# Patient Record
Sex: MALE | Race: WHITE | Employment: OTHER | URBAN - NONMETROPOLITAN AREA
[De-identification: names, ages, dates, MRNs, and addresses within clinical notes are randomized per-mention and may not be internally consistent; named-entity substitution may affect disease eponyms.]

---

## 2017-02-17 LAB
ANTIBODY: NEGATIVE
PROSTATE SPECIFIC ANTIGEN: 2.05 NG/ML
TSH SERPL DL<=0.05 MIU/L-ACNC: 2.7 UIU/ML

## 2018-02-13 LAB
AVERAGE GLUCOSE: NORMAL
CHOLESTEROL, TOTAL: 128 MG/DL
CHOLESTEROL/HDL RATIO: NORMAL
HBA1C MFR BLD: 8.5 %
HDLC SERPL-MCNC: 39 MG/DL (ref 35–70)
LDL CHOLESTEROL CALCULATED: 59 MG/DL (ref 0–160)
TRIGL SERPL-MCNC: 152 MG/DL
VLDLC SERPL CALC-MCNC: 89 MG/DL

## 2018-04-19 ENCOUNTER — TELEPHONE (OUTPATIENT)
Dept: FAMILY MEDICINE CLINIC | Age: 58
End: 2018-04-19

## 2020-01-12 ENCOUNTER — APPOINTMENT (OUTPATIENT)
Dept: GENERAL RADIOLOGY | Age: 60
End: 2020-01-12
Payer: OTHER GOVERNMENT

## 2020-01-12 ENCOUNTER — HOSPITAL ENCOUNTER (EMERGENCY)
Age: 60
Discharge: HOME | End: 2020-01-12
Payer: OTHER GOVERNMENT

## 2020-01-12 VITALS
HEIGHT: 72 IN | RESPIRATION RATE: 18 BRPM | OXYGEN SATURATION: 93 % | WEIGHT: 180 LBS | BODY MASS INDEX: 24.38 KG/M2 | DIASTOLIC BLOOD PRESSURE: 83 MMHG | SYSTOLIC BLOOD PRESSURE: 140 MMHG | TEMPERATURE: 99.2 F | HEART RATE: 94 BPM

## 2020-01-12 LAB
FLU A ANTIGEN: NEGATIVE
FLU B ANTIGEN: NEGATIVE

## 2020-01-12 PROCEDURE — 87804 INFLUENZA ASSAY W/OPTIC: CPT

## 2020-01-12 PROCEDURE — 71046 X-RAY EXAM CHEST 2 VIEWS: CPT

## 2020-01-12 PROCEDURE — 6370000000 HC RX 637 (ALT 250 FOR IP): Performed by: NURSE PRACTITIONER

## 2020-01-12 PROCEDURE — 99283 EMERGENCY DEPT VISIT LOW MDM: CPT

## 2020-01-12 RX ORDER — BENZONATATE 100 MG/1
100 CAPSULE ORAL ONCE
Status: COMPLETED | OUTPATIENT
Start: 2020-01-12 | End: 2020-01-12

## 2020-01-12 RX ORDER — BENZONATATE 100 MG/1
100 CAPSULE ORAL 3 TIMES DAILY PRN
Qty: 30 CAPSULE | Refills: 0 | Status: SHIPPED | OUTPATIENT
Start: 2020-01-12 | End: 2020-01-19

## 2020-01-12 RX ORDER — PREDNISONE 10 MG/1
TABLET ORAL
Qty: 20 TABLET | Refills: 0 | Status: SHIPPED | OUTPATIENT
Start: 2020-01-12 | End: 2020-01-22

## 2020-01-12 RX ORDER — IBUPROFEN 200 MG
600 TABLET ORAL ONCE
Status: COMPLETED | OUTPATIENT
Start: 2020-01-12 | End: 2020-01-12

## 2020-01-12 RX ADMIN — BENZONATATE 100 MG: 100 CAPSULE ORAL at 09:30

## 2020-01-12 RX ADMIN — Medication 600 MG: at 09:30

## 2020-01-12 ASSESSMENT — ENCOUNTER SYMPTOMS
RHINORRHEA: 1
SINUS PRESSURE: 0
NAUSEA: 0
DIARRHEA: 0
COLOR CHANGE: 0
EYE REDNESS: 0
VOMITING: 0
BLOOD IN STOOL: 0
CONSTIPATION: 0
ABDOMINAL DISTENTION: 0
WHEEZING: 1
PHOTOPHOBIA: 0
SHORTNESS OF BREATH: 0
CHEST TIGHTNESS: 0
COUGH: 1
SORE THROAT: 0
ABDOMINAL PAIN: 0
BACK PAIN: 0
VOICE CHANGE: 0

## 2020-01-12 ASSESSMENT — PAIN SCALES - GENERAL: PAINLEVEL_OUTOF10: 4

## 2020-01-12 NOTE — ED NOTES
Pt presents to ED for cough and body aches. Pt states he has been feeling sick since Tuesday. Pt states he thinks its is the flu because he didn't get a flu shot.      Vani Calzada  01/12/20 3563

## 2020-01-12 NOTE — ED PROVIDER NOTES
[facility] Emergency Department    CHIEF COMPLAINT       Chief Complaint   Patient presents with    Cough       Nurses Notes reviewed and I agree except as noted in the HPI. HISTORY OF PRESENT ILLNESS    Adam Sams bc 63 y.o. male who presents to the ED for evaluation of cough. Patient reports fever, cough, and generalized myalgias for five days. He notes wheezing, rhinorrhea, weakness, and fatigue. Patient states that two days ago he felt that his symptoms were resolving but then felt that his symptoms worsened that night. He reports that his temperature has been declining to 100F. Patient believes he caught these symptoms from his friend who had a cough one week ago when they went to dinner together. Patient does smoke cigarettes but denies being diagnosed with COPD or any lung disease. He denies a history of pneumonia but believes that he has pneumonia currently. Past medical history of GERD, HLD, HTN, hypothyroidism, nephrolithiasis, and DM. No further complaints at initial encounter. Pain description:  Onset: five days ago   Location: cough, generalized myalgias, fever  Duration: constant   Aggravating factors: weakness, fatigue, rhinorrhea, wheezing   Timing: felt relief two days ago before worsening of symptoms that night   Severity: 5/10    Experienced previously: no    HPI was provided by the patient. REVIEW OF SYSTEMS     Review of Systems   Constitutional: Positive for fatigue. Negative for appetite change, chills, diaphoresis and unexpected weight change. Fever: 100F. HENT: Positive for rhinorrhea. Negative for congestion, hearing loss, postnasal drip, sinus pressure, sore throat and voice change. Eyes: Negative for photophobia, redness and visual disturbance. Respiratory: Positive for cough and wheezing. Negative for chest tightness and shortness of breath. Cardiovascular: Negative for chest pain and palpitations.    Gastrointestinal: Negative for abdominal distention, abdominal pain, blood in stool, constipation, diarrhea, nausea and vomiting. Endocrine: Negative for cold intolerance, heat intolerance, polydipsia, polyphagia and polyuria. Genitourinary: Negative for decreased urine volume, difficulty urinating, dysuria, flank pain and frequency. Musculoskeletal: Positive for myalgias (generalized). Negative for arthralgias, back pain, gait problem, joint swelling, neck pain and neck stiffness. Skin: Negative for color change and rash. Allergic/Immunologic: Negative for immunocompromised state. Neurological: Positive for weakness. Negative for dizziness, tremors, light-headedness, numbness and headaches. Hematological: Does not bruise/bleed easily. Psychiatric/Behavioral: Negative for behavioral problems, confusion, decreased concentration, hallucinations, self-injury and suicidal ideas. The patient is not nervous/anxious. PAST MEDICAL HISTORY     Past Medical History:   Diagnosis Date    Anxiety     GERD (gastroesophageal reflux disease)     Hyperlipidemia     Hypertension     Hypothyroidism     Nephrolithiasis     Proteinuria     Type II or unspecified type diabetes mellitus without mention of complication, not stated as uncontrolled ([facility] [address]        SURGICALHISTORY      has no past surgical history on file. CURRENT MEDICATIONS       Discharge Medication List as of 1/12/2020 10:17 AM      CONTINUE these medications which have NOT CHANGED    Details   levothyroxine (LEVOTHROID) 50 MCG tablet Take 1 tablet daily. , Disp-90 tablet, R-1Normal      lisinopril (PRINIVIL;ZESTRIL) 10 MG tablet Take 1 tablet by mouth daily. , Disp-90 tablet, R-3Normal      nitroGLYCERIN (NITROSTAT) 0.4 MG SL tablet Place 0.4 mg under the tongue every 5 minutes as needed for Chest pain. ibuprofen (ADVIL;MOTRIN) 200 MG tablet Take 200 mg by mouth every 6 hours as needed for Pain. sertraline (ZOLOFT) 50 MG tablet Take 50 mg by mouth 2 times daily.       metoprolol (LOPRESSOR) 25 MG tablet Take 1 tablet by mouth 2 times daily. , Disp-60 tablet, R-3      metformin (GLUCOPHAGE) 1000 MG tablet Take 0.5 tablets by mouth daily (with breakfast). , Disp-180 tablet, R-3      pravastatin (PRAVACHOL) 40 MG tablet Take 2 tablets by mouth every evening., Disp-180 tablet, R-2      omeprazole (PRILOSEC) 20 MG capsule Take 1 capsule by mouth Daily. , Disp-30 capsule, R-11      aspirin EC 81 MG EC tablet Take 1 tablet by mouth daily. , Disp-90 tablet, R-3             ALLERGIES     is allergic to wellbutrin [bupropion hcl]. FAMILY HISTORY     He indicated that his mother is . He indicated that his father is . family history includes Heart Disease in his father; High Blood Pressure in his mother; Stroke in his mother.     SOCIAL HISTORY       Social History     Socioeconomic History    Marital status:      Spouse name: Not on file    Number of children: Not on file    Years of education: Not on file    Highest education level: Not on file   Occupational History     Employer: [employer]    Financial resource strain: Not on file    Food insecurity:     Worry: Not on file     Inability: Not on file   [employer] needs:     Medical: Not on file     Non-medical: Not on file   Tobacco Use    Smoking status: Current Every Day Smoker     Packs/day: 0.25     Types: Cigarettes    Smokeless tobacco: Never Used   Substance and Sexual Activity    Alcohol use: No    Drug use: No    Sexual activity: Not on file   Lifestyle    Physical activity:     Days per week: Not on file     Minutes per session: Not on file    Stress: Not on file   Relationships    Social connections:     Talks on phone: Not on file     Gets together: Not on file     Attends [employer] service: Not on file     Active member of club or organization: Not on file     Attends meetings of clubs or organizations: Not on file     Relationship status: Not on file    Intimate partner violence:     Fear of current or ex partner: Not on file     Emotionally abused: Not on file     Physically abused: Not on file     Forced sexual activity: Not on file   Other Topics Concern    Not on file   Social History Narrative    Not on file       PHYSICAL EXAM     INITIAL VITALS:  height is 6' (1.829 m) and weight is 180 lb (81.6 kg). His temperature is 99.2 °F (37.3 °C). His blood pressure is 140/83 (abnormal) and his pulse is 94. His respiration is 18 and oxygen saturation is 93%. Physical Exam  Vitals signs and nursing note reviewed. Constitutional:       Appearance: Normal appearance. He is well-developed. HENT:      Head: Normocephalic. Right Ear: External ear normal.      Left Ear: External ear normal.      Nose: Nose normal.      Mouth/Throat:      Pharynx: Uvula midline. Eyes:      Conjunctiva/sclera: Conjunctivae normal.      Pupils: Pupils are equal, round, and reactive to light. Neck:      Musculoskeletal: Normal range of motion and neck supple. Cardiovascular:      Rate and Rhythm: Normal rate and regular rhythm. Heart sounds: Normal heart sounds, S1 normal and S2 normal.   Pulmonary:      Effort: Pulmonary effort is normal. No respiratory distress. Breath sounds: Normal breath sounds. No decreased breath sounds, wheezing, rhonchi or rales. Chest:      Chest wall: No tenderness. Abdominal:      General: Bowel sounds are normal. There is no distension. Palpations: Abdomen is soft. Tenderness: There is no tenderness. Musculoskeletal: Normal range of motion. Skin:     General: Skin is warm and dry. Coloration: Skin is not pale. Findings: No erythema or rash. Neurological:      Mental Status: He is alert and oriented to person, place, and time. Psychiatric:         Behavior: Behavior normal.         Thought Content:  Thought content normal.         Judgment: Judgment normal.         DIFFERENTIAL DIAGNOSIS:   Including but not limited to verbalized understanding. While here in the emergency department vital signs improved and he was appropriate for discharge. Medications   ibuprofen (ADVIL;MOTRIN) tablet 600 mg (600 mg Oral Given 1/12/20 0930)   benzonatate (TESSALON) capsule 100 mg (100 mg Oral Given 1/12/20 0930)       Patient was seenindependently by myself. The patient's final impression and disposition and plan was determined by myself. CRITICAL CARE:   None    CONSULTS:  None    PROCEDURES:  None    FINAL IMPRESSION     1. Viral URI with cough          DISPOSITION/PLAN   Discharge     PATIENT REFERREDTO:  Octavioprema Aniya, [address] [facility] [address]  [phone]    Call in 4 days  If symptoms worsen or fail to improve      DISCHARGE MEDICATIONS:  Discharge Medication List as of 1/12/2020 10:17 AM      START taking these medications    Details   benzonatate (TESSALON PERLES) 100 MG capsule Take 1 capsule by mouth 3 times daily as needed for Cough, Disp-30 capsule, R-0Print      predniSONE (DELTASONE) 10 MG tablet Take 4 tablets by mouth once daily for 5 days, Disp-20 tablet, R-0Print             (Please note that portions of this note were completed with a voice recognition program.  Efforts were made to edit the dictations but occasionally words are mis-transcribed.)    Scribe:  Coby Kocher 1/12/20 9:15 AM Scribing for and in the presence of Adarsh Bowers CNP. Signed by: Coby Kocher, Scribe, 01/12/20 12:06 PM    Provider:  I personally performed the services described in the documentation,reviewed and edited the documentation which was dictated to the scribe in my presence, and it accurately records my words and actions.     Adarsh Bowers CNP 01/12/20 12:06 PM    [address] CNP  01/12/20 4040

## 2021-05-25 ENCOUNTER — HOSPITAL ENCOUNTER (EMERGENCY)
Age: 61
Discharge: HOME | End: 2021-05-25
Attending: EMERGENCY MEDICINE
Payer: OTHER GOVERNMENT

## 2021-05-25 VITALS
HEIGHT: 72 IN | WEIGHT: 192 LBS | TEMPERATURE: 97.1 F | OXYGEN SATURATION: 96 % | DIASTOLIC BLOOD PRESSURE: 79 MMHG | HEART RATE: 83 BPM | RESPIRATION RATE: 16 BRPM | SYSTOLIC BLOOD PRESSURE: 129 MMHG | BODY MASS INDEX: 26.01 KG/M2

## 2021-05-25 DIAGNOSIS — L72.3 SEBACEOUS CYST: Primary | ICD-10-CM

## 2021-05-25 DIAGNOSIS — Z20.822 PERSON UNDER INVESTIGATION FOR COVID-19: ICD-10-CM

## 2021-05-25 DIAGNOSIS — B34.9 SYSTEMIC VIRAL ILLNESS: ICD-10-CM

## 2021-05-25 DIAGNOSIS — E11.9 DIABETES MELLITUS TYPE 2 IN NONOBESE (HCC): ICD-10-CM

## 2021-05-25 DIAGNOSIS — F17.200 TOBACCO USE DISORDER: ICD-10-CM

## 2021-05-25 LAB — SARS-COV-2, NAA: NOT DETECTED

## 2021-05-25 PROCEDURE — 87635 SARS-COV-2 COVID-19 AMP PRB: CPT

## 2021-05-25 PROCEDURE — 99213 OFFICE O/P EST LOW 20 MIN: CPT | Performed by: EMERGENCY MEDICINE

## 2021-05-25 PROCEDURE — 99213 OFFICE O/P EST LOW 20 MIN: CPT

## 2021-05-25 PROCEDURE — 10060 I&D ABSCESS SIMPLE/SINGLE: CPT | Performed by: EMERGENCY MEDICINE

## 2021-05-25 RX ORDER — ALOGLIPTIN 25 MG/1
25 TABLET, FILM COATED ORAL DAILY
COMMUNITY

## 2021-05-25 RX ORDER — CETIRIZINE HYDROCHLORIDE 10 MG/1
10 TABLET ORAL DAILY
COMMUNITY

## 2021-05-25 RX ORDER — FAMOTIDINE 20 MG/1
20 TABLET, FILM COATED ORAL 2 TIMES DAILY
COMMUNITY

## 2021-05-25 RX ORDER — KETOTIFEN FUMARATE 0.35 MG/ML
1 SOLUTION/ DROPS OPHTHALMIC PRN
COMMUNITY

## 2021-05-25 RX ORDER — GABAPENTIN 100 MG/1
100 CAPSULE ORAL 3 TIMES DAILY
COMMUNITY
End: 2021-12-22

## 2021-05-25 RX ORDER — FLUTICASONE PROPIONATE 50 MCG
1 SPRAY, SUSPENSION (ML) NASAL DAILY
COMMUNITY

## 2021-05-25 RX ORDER — GLIPIZIDE 5 MG/1
5 TABLET, FILM COATED, EXTENDED RELEASE ORAL DAILY
COMMUNITY

## 2021-05-25 RX ORDER — DOXYCYCLINE HYCLATE 100 MG
100 TABLET ORAL 2 TIMES DAILY
Qty: 14 TABLET | Refills: 0 | Status: SHIPPED | OUTPATIENT
Start: 2021-05-25 | End: 2021-06-01

## 2021-05-25 RX ORDER — INSULIN GLARGINE 100 [IU]/ML
INJECTION, SOLUTION SUBCUTANEOUS DAILY
COMMUNITY

## 2021-05-25 RX ORDER — LIDOCAINE HYDROCHLORIDE AND EPINEPHRINE BITARTRATE 20; .01 MG/ML; MG/ML
INJECTION, SOLUTION SUBCUTANEOUS
Status: DISCONTINUED
Start: 2021-05-25 | End: 2021-05-25 | Stop reason: HOSPADM

## 2021-05-25 ASSESSMENT — ENCOUNTER SYMPTOMS
STRIDOR: 0
SINUS PRESSURE: 0
BACK PAIN: 0
VOMITING: 0
SORE THROAT: 0
TROUBLE SWALLOWING: 0
WHEEZING: 0
EYE PAIN: 0
EYE REDNESS: 0
NAUSEA: 0
SHORTNESS OF BREATH: 0
COLOR CHANGE: 1
DIARRHEA: 0
EYE DISCHARGE: 0
ABDOMINAL PAIN: 0
COUGH: 0
VOICE CHANGE: 0

## 2021-05-25 NOTE — ED PROVIDER NOTES
negative. PAST MEDICAL HISTORY         Diagnosis Date    Anxiety     Depression     GERD (gastroesophageal reflux disease)     Hyperlipidemia     Hypertension     Hypothyroidism     Nephrolithiasis     Proteinuria     Type II or unspecified type diabetes mellitus without mention of complication, not stated as uncontrolled        SURGICAL HISTORY     Patient  has no past surgical history on file. CURRENT MEDICATIONS       Discharge Medication List as of 5/25/2021 10:41 AM      CONTINUE these medications which have NOT CHANGED    Details   famotidine (PEPCID) 20 MG tablet Take 20 mg by mouth 2 times dailyHistorical Med      insulin glargine (LANTUS) 100 UNIT/ML injection vial Inject into the skin dailyHistorical Med      glipiZIDE (GLUCOTROL XL) 5 MG extended release tablet Take 5 mg by mouth dailyHistorical Med      alogliptin (NESINA) 25 MG TABS tablet Take 25 mg by mouth dailyHistorical Med      fluticasone (FLONASE) 50 MCG/ACT nasal spray 1 spray by Each Nostril route dailyHistorical Med      gabapentin (NEURONTIN) 100 MG capsule Take 100 mg by mouth 3 times daily. Historical Med      cetirizine (ZYRTEC) 10 MG tablet Take 10 mg by mouth dailyHistorical Med      ketotifen (KP KETOTIFEN FUMARATE) 0.025 % ophthalmic solution 1 drop as neededHistorical Med      levothyroxine (LEVOTHROID) 50 MCG tablet Take 1 tablet daily. , Disp-90 tablet, R-1Normal      lisinopril (PRINIVIL;ZESTRIL) 10 MG tablet Take 1 tablet by mouth daily. , Disp-90 tablet, R-3Normal      nitroGLYCERIN (NITROSTAT) 0.4 MG SL tablet Place 0.4 mg under the tongue every 5 minutes as needed for Chest pain. ibuprofen (ADVIL;MOTRIN) 200 MG tablet Take 200 mg by mouth every 6 hours as needed for Pain.      metoprolol (LOPRESSOR) 25 MG tablet Take 1 tablet by mouth 2 times daily. , Disp-60 tablet, R-3      metformin (GLUCOPHAGE) 1000 MG tablet Take 0.5 tablets by mouth daily (with breakfast). , Disp-180 tablet, R-3      pravastatin (PRAVACHOL) 40 MG tablet Take 2 tablets by mouth every evening., Disp-180 tablet, R-2      omeprazole (PRILOSEC) 20 MG capsule Take 1 capsule by mouth Daily. , Disp-30 capsule, R-11      aspirin EC 81 MG EC tablet Take 1 tablet by mouth daily. , Disp-90 tablet, R-3             ALLERGIES     Patient is is allergic to wellbutrin [bupropion hcl]. FAMILY HISTORY     Patient'sfamily history includes Heart Disease in his father; High Blood Pressure in his mother; Stroke in his mother. SOCIAL HISTORY     Patient  reports that he has been smoking cigarettes. He has been smoking about 0.25 packs per day. He has never used smokeless tobacco. He reports that he does not drink alcohol and does not use drugs. PHYSICAL EXAM     ED TRIAGE VITALS  BP: 129/79, Temp: 97.1 °F (36.2 °C), Pulse: 83, Resp: 16, SpO2: 96 %  Physical Exam  Vitals and nursing note reviewed. Constitutional:       General: He is not in acute distress. Appearance: He is well-developed. He is not ill-appearing. Comments: Moist membranes, normal airway   HENT:      Head: Normocephalic and atraumatic. Right Ear: Tympanic membrane and external ear normal.      Left Ear: Tympanic membrane and external ear normal.      Nose: Nose normal.      Mouth/Throat:      Pharynx: No oropharyngeal exudate or posterior oropharyngeal erythema. Tonsils: No tonsillar exudate. Comments: Oropharynx normal  Eyes:      General: No scleral icterus. Right eye: No discharge. Left eye: No discharge. Extraocular Movements:      Right eye: Normal extraocular motion. Left eye: Normal extraocular motion. Conjunctiva/sclera: Conjunctivae normal.      Pupils: Pupils are equal, round, and reactive to light. Comments:     Conjunctiva clear     Neck:      Thyroid: No thyromegaly. Vascular: No JVD. Comments: No meningismus  Cardiovascular:      Rate and Rhythm: Normal rate and regular rhythm. Pulses: Normal pulses. Heart sounds: Normal heart sounds, S1 normal and S2 normal. No murmur heard. No friction rub. No gallop. Pulmonary:      Effort: Pulmonary effort is normal. No respiratory distress. Breath sounds: Normal breath sounds. No stridor. No wheezing or rales. Comments: No cough, lungs clear  Chest:      Chest wall: No tenderness. Abdominal:      General: Bowel sounds are normal. There is no distension. Palpations: Abdomen is soft. There is no mass. Tenderness: There is no abdominal tenderness. There is no right CVA tenderness, left CVA tenderness, guarding or rebound. Comments: Soft nontender   Musculoskeletal:         General: No tenderness. Normal range of motion. Cervical back: Normal range of motion. Lymphadenopathy:      Cervical: Cervical adenopathy present. Right cervical: Superficial cervical adenopathy present. No deep cervical adenopathy. Left cervical: Superficial cervical adenopathy present. No deep cervical adenopathy. Skin:     General: Skin is warm and dry. Findings: No erythema or rash. Comments: Sebaceous cyst thoracic back versus embedded foreign body. Doubt tick   Neurological:      Mental Status: He is alert and oriented to person, place, and time. Cranial Nerves: No cranial nerve deficit. Motor: No abnormal muscle tone. Coordination: Coordination normal.      Deep Tendon Reflexes: Reflexes are normal and symmetric. Reflexes normal.      Comments: Appropriate, no focal finding   Psychiatric:         Behavior: Behavior normal.         Thought Content:  Thought content normal.         Judgment: Judgment normal.         DIAGNOSTIC RESULTS   Labs:   Results for orders placed or performed during the hospital encounter of 05/25/21   COVID-19   Result Value Ref Range    SARS-CoV-2, LARISSA Not Detected NOT DETECTED       IMAGING:  No orders to display     URGENT CARE COURSE:     Vitals:    05/25/21 1008   BP: 129/79   Pulse: 83   Resp: 16 Temp: 97.1 °F (36.2 °C)   TempSrc: Infrared   SpO2: 96%   Weight: 192 lb (87.1 kg)   Height: 6' (1.829 m)       Medications   lidocaine-EPINEPHrine 2 percent-1:827629 injection (has no administration in time range)   Tolerated well  PROCEDURES:  Foreign body removal thoracic back. Patient received copious amounts of wound cleansing spray and Betadine cleansing. Site was anesthetized with 2% lidocaine with epinephrine approximately 2 mL total for complete anesthesia. Using 11 scalpel blade skin overlying embedded foreign body was unroofed and appeared to be sebaceous cyst/epidermoid cyst with hardened sebaceous material.  All sebaceous material removed. No tick. No woody foreign body. No purulent discharge. Patient tolerated well without complications and improved. Additional cleansing accomplished with wound cleansing spray and Band-Aid applied over wound. No pack indicated. FINALIMPRESSION      1. Sebaceous cyst    2. Systemic viral illness    3. Person under investigation for COVID-19    4. Tobacco use disorder    5. Diabetes mellitus type 2 in nonobese (HCC)        DISPOSITION/PLAN   DISPOSITION    Nontoxic, well-hydrated, normal airway. No airway abscess or epiglottitis, sepsis, CNS infection, pneumonia, hypoxia, bronchospasm. No ACS, CHF, PE.  COVID-19 test performed and rapid result negative. No bacterial infection apparent. Patient had sebaceous cyst that was incised and drained. Will treat with doxycycline, Bactroban ointment after peroxide cleaning, Tylenol, increased oral clear liquids, rest.  Patient to follow-up with PCP in 6 days for recheck, and he understands to go to ED if worse.   PATIENT REFERRED TO:  Darien Hadley[address] [address] [phone]    Schedule an appointment as soon as possible for a visit in 6 days  Recheck if problems persist, go to emergency department    DISCHARGE MEDICATIONS:  Discharge Medication List as of 5/25/2021 10:41 AM

## 2021-05-25 NOTE — ED NOTES
Covid nasal pharyngeal swab obtained and sent to lab. Proper ppe worn during procedure. Pt tolerated well. Pt. Released in stable condition, ambulated per self to private car. Instructed pt to follow-up with family doctor as needed for recheck or go directly to the emergency department for any concerns/worsening conditions. Pt. Verbalized understanding of instructions. No questions at this time. RX in hand.       Jenny Dye RN  05/25/21 [id number]

## 2021-05-25 NOTE — ED TRIAGE NOTES
Noticed a tick on back last night, has had a headache, has had an off an on fever for the last 3 days, fatigued

## 2021-12-22 ENCOUNTER — HOSPITAL ENCOUNTER (EMERGENCY)
Age: 61
Discharge: HOME | End: 2021-12-22
Payer: OTHER GOVERNMENT

## 2021-12-22 VITALS
OXYGEN SATURATION: 98 % | BODY MASS INDEX: 26.04 KG/M2 | DIASTOLIC BLOOD PRESSURE: 63 MMHG | TEMPERATURE: 98.2 F | WEIGHT: 192 LBS | HEART RATE: 69 BPM | RESPIRATION RATE: 18 BRPM | SYSTOLIC BLOOD PRESSURE: 114 MMHG

## 2021-12-22 DIAGNOSIS — J20.9 ACUTE BRONCHITIS, UNSPECIFIED ORGANISM: Primary | ICD-10-CM

## 2021-12-22 DIAGNOSIS — Z20.822 LAB TEST NEGATIVE FOR COVID-19 VIRUS: ICD-10-CM

## 2021-12-22 LAB — SARS-COV-2, NAA: NOT  DETECTED

## 2021-12-22 PROCEDURE — 99213 OFFICE O/P EST LOW 20 MIN: CPT

## 2021-12-22 PROCEDURE — 87635 SARS-COV-2 COVID-19 AMP PRB: CPT

## 2021-12-22 PROCEDURE — 99213 OFFICE O/P EST LOW 20 MIN: CPT | Performed by: NURSE PRACTITIONER

## 2021-12-22 RX ORDER — AZITHROMYCIN 250 MG/1
TABLET, FILM COATED ORAL
Qty: 6 TABLET | Refills: 0 | Status: SHIPPED | OUTPATIENT
Start: 2021-12-22

## 2021-12-22 ASSESSMENT — ENCOUNTER SYMPTOMS
DIARRHEA: 0
VOMITING: 0
SHORTNESS OF BREATH: 0
TROUBLE SWALLOWING: 0
SINUS PRESSURE: 0
BACK PAIN: 0
NAUSEA: 0
COUGH: 1
RHINORRHEA: 1
SORE THROAT: 0

## 2021-12-22 NOTE — ED TRIAGE NOTES
Patient states  exposed to covid, nasal swab obtained. Labeled, taken to lab, tolerated well. Nurse  Wearing PPE. Symptoms started 7 days ago, dry cough, intermittent dull headaches, runny nose, yellow mucus, fever. Ibuprofen taken.

## 2021-12-22 NOTE — ED PROVIDER NOTES
[facility]  Urgent Care Encounter       CHIEF COMPLAINT       Chief Complaint   Patient presents with    Cough    Nasal Congestion    Concern For COVID-19     fever    Nausea       Nurses Notes reviewed and I agree except as noted in the HPI. HISTORY OF PRESENT ILLNESS   Suraj Ayala is a 62 y.o. male who presents to the urgent care center complaining of a productive cough nasal congestion some slight nausea. Patient did have concern for Covid apparently his friend tested positive for Covid who he has been around. Patient denies any loss of taste, chest pain shortness of breath. At present time patient does not appear to be in any acute distress. (See HPI template)    The history is provided by the patient. No  was used. Cough  Cough characteristics:  Productive  Sputum characteristics:  Yellow  Severity:  Moderate  Onset quality:  Sudden  Duration:  7 days  Timing:  Intermittent  Progression:  Waxing and waning  Chronicity:  New  Smoker: yes    Context: sick contacts    Associated symptoms: rhinorrhea    Associated symptoms: no chest pain, no chills, no ear pain, no fever, no headaches, no rash, no shortness of breath and no sore throat        REVIEW OF SYSTEMS     Review of Systems   Constitutional: Negative for activity change, appetite change, chills, fatigue and fever. HENT: Positive for congestion and rhinorrhea. Negative for ear pain, sinus pressure, sore throat and trouble swallowing. Respiratory: Positive for cough. Negative for shortness of breath. Cardiovascular: Negative for chest pain. Gastrointestinal: Negative for diarrhea, nausea and vomiting. Musculoskeletal: Negative for back pain and neck stiffness. Skin: Negative for rash. Allergic/Immunologic: Negative for environmental allergies. Neurological: Negative for dizziness, light-headedness and headaches. Hematological: Negative for adenopathy.        PAST MEDICAL HISTORY         Diagnosis Date    Anxiety     Depression     GERD (gastroesophageal reflux disease)     Hyperlipidemia     Hypertension     Hypothyroidism     Nephrolithiasis     Proteinuria     Type II or unspecified type diabetes mellitus without mention of complication, not stated as uncontrolled        SURGICALHISTORY     Patient  has no past surgical history on file. CURRENT MEDICATIONS       Discharge Medication List as of 12/22/2021  3:28 PM      CONTINUE these medications which have NOT CHANGED    Details   famotidine (PEPCID) 20 MG tablet Take 20 mg by mouth 2 times dailyHistorical Med      insulin glargine (LANTUS) 100 UNIT/ML injection vial Inject into the skin dailyHistorical Med      glipiZIDE (GLUCOTROL XL) 5 MG extended release tablet Take 5 mg by mouth dailyHistorical Med      alogliptin (NESINA) 25 MG TABS tablet Take 25 mg by mouth dailyHistorical Med      fluticasone (FLONASE) 50 MCG/ACT nasal spray 1 spray by Each Nostril route dailyHistorical Med      cetirizine (ZYRTEC) 10 MG tablet Take 10 mg by mouth dailyHistorical Med      ketotifen (KP KETOTIFEN FUMARATE) 0.025 % ophthalmic solution 1 drop as neededHistorical Med      levothyroxine (LEVOTHROID) 50 MCG tablet Take 1 tablet daily. , Disp-90 tablet, R-1Normal      lisinopril (PRINIVIL;ZESTRIL) 10 MG tablet Take 1 tablet by mouth daily. , Disp-90 tablet, R-3Normal      ibuprofen (ADVIL;MOTRIN) 200 MG tablet Take 200 mg by mouth every 6 hours as needed for Pain.      metoprolol (LOPRESSOR) 25 MG tablet Take 1 tablet by mouth 2 times daily. , Disp-60 tablet, R-3      metformin (GLUCOPHAGE) 1000 MG tablet Take 0.5 tablets by mouth daily (with breakfast). , Disp-180 tablet, R-3      pravastatin (PRAVACHOL) 40 MG tablet Take 2 tablets by mouth every evening., Disp-180 tablet, R-2      omeprazole (PRILOSEC) 20 MG capsule Take 1 capsule by mouth Daily. , Disp-30 capsule, R-11      aspirin EC 81 MG EC tablet Take 1 tablet by mouth daily. , Disp-90 tablet, R-3      nitroGLYCERIN (NITROSTAT) 0.4 MG SL tablet Place 0.4 mg under the tongue every 5 minutes as needed for Chest pain. ALLERGIES     Patient is is allergic to wellbutrin [bupropion hcl]. Patients   Immunization History   Administered Date(s) Administered    Td, unspecified formulation 07/10/2008    Tdap (Boostrix, Adacel) 02/16/2017       FAMILY HISTORY     Patient's family history includes Heart Disease in his father; High Blood Pressure in his mother; Stroke in his mother. SOCIAL HISTORY     Patient  reports that he has been smoking cigarettes. He has been smoking about 0.25 packs per day. He has never used smokeless tobacco. He reports that he does not drink alcohol and does not use drugs. PHYSICAL EXAM     ED TRIAGE VITALS  BP: 114/63, Temp: 98.2 °F (36.8 °C), Pulse: 69, Resp: 18, SpO2: 98 %,Estimated body mass index is 26.04 kg/m² as calculated from the following:    Height as of 5/25/21: 6' (1.829 m). Weight as of this encounter: 192 lb (87.1 kg). ,No LMP for male patient. Physical Exam  Vitals and nursing note reviewed. Constitutional:       General: He is not in acute distress. Appearance: He is well-developed. He is not ill-appearing, toxic-appearing or diaphoretic. HENT:      Head: Normocephalic. Right Ear: Hearing, tympanic membrane, ear canal and external ear normal. Tympanic membrane is not erythematous. Left Ear: Hearing, tympanic membrane, ear canal and external ear normal. Tympanic membrane is not erythematous. Nose: Congestion present. No rhinorrhea. Right Turbinates: Not enlarged or swollen. Left Turbinates: Not enlarged or swollen. Mouth/Throat:      Lips: Pink. Mouth: Mucous membranes are moist.      Tongue: No lesions. Pharynx: Oropharynx is clear. Uvula midline. No pharyngeal swelling, oropharyngeal exudate, posterior oropharyngeal erythema or uvula swelling.       Tonsils: No tonsillar exudate or tonsillar abscesses. Eyes:      Conjunctiva/sclera: Conjunctivae normal.      Pupils: Pupils are equal, round, and reactive to light. Cardiovascular:      Rate and Rhythm: Normal rate and regular rhythm. Heart sounds: Normal heart sounds, S1 normal and S2 normal.   Pulmonary:      Effort: Pulmonary effort is normal. No accessory muscle usage. Breath sounds: Normal breath sounds and air entry. No decreased air movement. No decreased breath sounds, wheezing, rhonchi or rales. Musculoskeletal:      Cervical back: Full passive range of motion without pain, normal range of motion and neck supple. No rigidity. Lymphadenopathy:      Head:      Right side of head: No submental, submandibular, tonsillar, preauricular, posterior auricular or occipital adenopathy. Left side of head: No submental, submandibular, tonsillar, preauricular, posterior auricular or occipital adenopathy. Cervical: No cervical adenopathy. Right cervical: No superficial, deep or posterior cervical adenopathy. Left cervical: No superficial, deep or posterior cervical adenopathy. Skin:     General: Skin is warm and dry. Capillary Refill: Capillary refill takes less than 2 seconds. Neurological:      General: No focal deficit present. Mental Status: He is alert and oriented to person, place, and time. Psychiatric:         Mood and Affect: Mood normal.         Speech: Speech normal.         Behavior: Behavior normal. Behavior is cooperative.          DIAGNOSTIC RESULTS     Labs:  Results for orders placed or performed during the hospital encounter of 12/22/21   COVID-19, Rapid   Result Value Ref Range    SARS-CoV-2, LARISSA NOT  DETECTED NOT DETECTED       IMAGING:    No orders to display         EKG:      URGENT CARE COURSE:     Vitals:    12/22/21 1458   BP: 114/63   Pulse: 69   Resp: 18   Temp: 98.2 °F (36.8 °C)   TempSrc: Oral   SpO2: 98%   Weight: 192 lb (87.1 kg)       Medications - No data to display         PROCEDURES:  None    FINAL IMPRESSION      1. Acute bronchitis, unspecified organism    2. Lab test negative for COVID-19 virus          DISPOSITION/ PLAN      The patient/Patient representative was advised to rest, drink lots of fluids, take Motrin and Tylenol for any fever, chills generalized body aches. The patient was also advised to monitor urine output for signs of dehydration. If the patient develops any chest pain, shortness of breath, neck pain or stiffness or abdominal pain or any other concerns, the patient is to call 911 or go to the emergency department for further evaluation. If the patient does not experience any of the above symptoms he is to follow-up with his primary care provider in 2-3 days for reevaluation. The patient/Patient representative are agreeable to the treatment plan at this time. The patient left the urgent care center in stable condition.     PATIENT REFERRED TO:  Jesus Moore MD  [address] / [place] [address]      DISCHARGE MEDICATIONS:  Discharge Medication List as of 12/22/2021  3:28 PM      START taking these medications    Details   azithromycin (ZITHROMAX Z-AMELIE) 250 MG tablet 2 tablets day 1 then1 tablet days 2 - 5., Disp-6 tablet, R-0Normal             Discharge Medication List as of 12/22/2021  3:28 PM      STOP taking these medications       gabapentin (NEURONTIN) 100 MG capsule Comments:   Reason for Stopping:               Discharge Medication List as of 12/22/2021  3:28 PM          CED Barajas CNP    (Please note that portions of this note were completed with a voice recognition program. Efforts were made to edit the dictations but occasionally words are mis-transcribed.)           CED Barajas CNP  12/22/21 6387

## 2022-06-03 ENCOUNTER — HOSPITAL ENCOUNTER (OUTPATIENT)
Dept: CT IMAGING | Age: 62
Discharge: HOME | End: 2022-06-03
Payer: OTHER GOVERNMENT

## 2022-06-03 ENCOUNTER — HOSPITAL ENCOUNTER (OUTPATIENT)
Dept: ULTRASOUND IMAGING | Age: 62
Discharge: HOME | End: 2022-06-03
Payer: OTHER GOVERNMENT

## 2022-06-03 DIAGNOSIS — Z13.6 ENCOUNTER FOR SCREENING FOR CARDIOVASCULAR DISORDERS: ICD-10-CM

## 2022-06-03 DIAGNOSIS — Z87.891 PERSONAL HISTORY OF TOBACCO USE, PRESENTING HAZARDS TO HEALTH: ICD-10-CM

## 2022-06-03 DIAGNOSIS — F17.200 CURRENT SMOKER: ICD-10-CM

## 2022-06-03 PROCEDURE — 76706 US ABDL AORTA SCREEN AAA: CPT

## 2022-06-03 PROCEDURE — 71271 CT THORAX LUNG CANCER SCR C-: CPT

## 2022-11-04 ENCOUNTER — HOSPITAL ENCOUNTER (EMERGENCY)
Age: 62
Discharge: HOME | End: 2022-11-04
Payer: OTHER GOVERNMENT

## 2022-11-04 VITALS
DIASTOLIC BLOOD PRESSURE: 85 MMHG | TEMPERATURE: 98.2 F | OXYGEN SATURATION: 98 % | SYSTOLIC BLOOD PRESSURE: 152 MMHG | RESPIRATION RATE: 18 BRPM | HEART RATE: 82 BPM

## 2022-11-04 DIAGNOSIS — R81 GLUCOSURIA WITH NORMAL SERUM GLUCOSE: ICD-10-CM

## 2022-11-04 LAB
ALBUMIN SERPL-MCNC: 4.2 G/DL (ref 3.5–5.1)
ALP BLD-CCNC: 73 U/L (ref 38–126)
ALT SERPL-CCNC: 15 U/L (ref 11–66)
ANION GAP SERPL CALCULATED.3IONS-SCNC: 14 MEQ/L (ref 8–16)
ANISOCYTOSIS: PRESENT
AST SERPL-CCNC: 21 U/L (ref 5–40)
BASOPHILS # BLD: 1.1 %
BASOPHILS ABSOLUTE: 0.1 THOU/MM3 (ref 0–0.1)
BETA-HYDROXYBUTYRATE: 6.65 MG/DL (ref 0.2–2.81)
BILIRUB SERPL-MCNC: 0.3 MG/DL (ref 0.3–1.2)
BILIRUBIN URINE: NEGATIVE
BLOOD, URINE: NEGATIVE
BUN BLDV-MCNC: 15 MG/DL (ref 7–22)
CALCIUM SERPL-MCNC: 8.9 MG/DL (ref 8.5–10.5)
CHARACTER, URINE: CLEAR
CHLORIDE BLD-SCNC: 103 MEQ/L (ref 98–111)
CO2: 20 MEQ/L (ref 23–33)
COLOR: YELLOW
CREAT SERPL-MCNC: 0.7 MG/DL (ref 0.4–1.2)
ELLIPTOCYTES: ABNORMAL
EOSINOPHIL # BLD: 1.7 %
EOSINOPHILS ABSOLUTE: 0.1 THOU/MM3 (ref 0–0.4)
ERYTHROCYTE [DISTWIDTH] IN BLOOD BY AUTOMATED COUNT: 22.3 % (ref 11.5–14.5)
ERYTHROCYTE [DISTWIDTH] IN BLOOD BY AUTOMATED COUNT: 48.9 FL (ref 35–45)
GFR SERPL CREATININE-BSD FRML MDRD: > 60 ML/MIN/1.73M2
GLUCOSE BLD-MCNC: 99 MG/DL (ref 70–108)
GLUCOSE URINE: >= 1000 MG/DL
HCT VFR BLD CALC: 44 % (ref 42–52)
HEMOGLOBIN: 12.4 GM/DL (ref 14–18)
HYPOCHROMIA: PRESENT
IMMATURE GRANS (ABS): 0.02 THOU/MM3 (ref 0–0.07)
IMMATURE GRANULOCYTES: 0.2 %
KETONES, URINE: ABNORMAL
LEUKOCYTE ESTERASE, URINE: NEGATIVE
LYMPHOCYTES # BLD: 27.8 %
LYMPHOCYTES ABSOLUTE: 2.3 THOU/MM3 (ref 1–4.8)
MCH RBC QN AUTO: 19.1 PG (ref 26–33)
MCHC RBC AUTO-ENTMCNC: 28.2 GM/DL (ref 32.2–35.5)
MCV RBC AUTO: 67.8 FL (ref 80–94)
MICROCYTES: PRESENT
MONOCYTES # BLD: 9 %
MONOCYTES ABSOLUTE: 0.7 THOU/MM3 (ref 0.4–1.3)
NITRITE, URINE: NEGATIVE
NUCLEATED RED BLOOD CELLS: 0 /100 WBC
OSMOLALITY CALCULATION: 274.7 MOSMOL/KG (ref 275–300)
PH UA: 5 (ref 5–9)
PLATELET # BLD: 332 THOU/MM3 (ref 130–400)
PLATELET ESTIMATE: ADEQUATE
PMV BLD AUTO: 10 FL (ref 9.4–12.4)
POTASSIUM SERPL-SCNC: 4.1 MEQ/L (ref 3.5–5.2)
PROTEIN UA: NEGATIVE
RBC # BLD: 6.49 MILL/MM3 (ref 4.7–6.1)
SCAN OF BLOOD SMEAR: NORMAL
SEG NEUTROPHILS: 60.2 %
SEGMENTED NEUTROPHILS ABSOLUTE COUNT: 5 THOU/MM3 (ref 1.8–7.7)
SODIUM BLD-SCNC: 137 MEQ/L (ref 135–145)
SPECIFIC GRAVITY, URINE: > 1.03 (ref 1–1.03)
TOTAL PROTEIN: 6.9 G/DL (ref 6.1–8)
UROBILINOGEN, URINE: 0.2 EU/DL (ref 0–1)
WBC # BLD: 8.3 THOU/MM3 (ref 4.8–10.8)

## 2022-11-04 PROCEDURE — 99283 EMERGENCY DEPT VISIT LOW MDM: CPT

## 2022-11-04 PROCEDURE — 81003 URINALYSIS AUTO W/O SCOPE: CPT

## 2022-11-04 PROCEDURE — 36415 COLL VENOUS BLD VENIPUNCTURE: CPT

## 2022-11-04 PROCEDURE — 80053 COMPREHEN METABOLIC PANEL: CPT

## 2022-11-04 PROCEDURE — 82010 KETONE BODYS QUAN: CPT

## 2022-11-04 PROCEDURE — 85025 COMPLETE CBC W/AUTO DIFF WBC: CPT

## 2022-11-04 ASSESSMENT — ENCOUNTER SYMPTOMS
BACK PAIN: 0
CHEST TIGHTNESS: 0
TROUBLE SWALLOWING: 0
ABDOMINAL PAIN: 0
VOMITING: 0
SHORTNESS OF BREATH: 0
NAUSEA: 0
COUGH: 0

## 2022-11-04 ASSESSMENT — PAIN - FUNCTIONAL ASSESSMENT: PAIN_FUNCTIONAL_ASSESSMENT: NONE - DENIES PAIN

## 2022-11-04 NOTE — ED NOTES
Pt to ED c/o large amount of ketones in urine and glucose of 1000 in urine. Pt states he had routine blood work done at the [place] office and they called him to be seen at the ED. VSS. Pt denies any pain or needs. Pt states he took his blood sugar before coming and it was 131.       Thania Hooker RN  11/04/22 3083

## 2022-11-04 NOTE — ED PROVIDER NOTES
Previous Medications    ALOGLIPTIN (NESINA) 25 MG TABS TABLET    Take 25 mg by mouth daily    ASPIRIN EC 81 MG EC TABLET    Take 1 tablet by mouth daily. AZITHROMYCIN (ZITHROMAX Z-AMELIE) 250 MG TABLET    2 tablets day 1 then1 tablet days 2 - 5. CETIRIZINE (ZYRTEC) 10 MG TABLET    Take 10 mg by mouth daily    FAMOTIDINE (PEPCID) 20 MG TABLET    Take 20 mg by mouth 2 times daily    FLUTICASONE (FLONASE) 50 MCG/ACT NASAL SPRAY    1 spray by Each Nostril route daily    GLIPIZIDE (GLUCOTROL XL) 5 MG EXTENDED RELEASE TABLET    Take 5 mg by mouth daily    IBUPROFEN (ADVIL;MOTRIN) 200 MG TABLET    Take 200 mg by mouth every 6 hours as needed for Pain. INSULIN GLARGINE (LANTUS) 100 UNIT/ML INJECTION VIAL    Inject into the skin daily    KETOTIFEN (KP KETOTIFEN FUMARATE) 0.025 % OPHTHALMIC SOLUTION    1 drop as needed    LEVOTHYROXINE (LEVOTHROID) 50 MCG TABLET    Take 1 tablet daily. LISINOPRIL (PRINIVIL;ZESTRIL) 10 MG TABLET    Take 1 tablet by mouth daily. METFORMIN (GLUCOPHAGE) 1000 MG TABLET    Take 0.5 tablets by mouth daily (with breakfast). METOPROLOL (LOPRESSOR) 25 MG TABLET    Take 1 tablet by mouth 2 times daily. NITROGLYCERIN (NITROSTAT) 0.4 MG SL TABLET    Place 0.4 mg under the tongue every 5 minutes as needed for Chest pain. OMEPRAZOLE (PRILOSEC) 20 MG CAPSULE    Take 1 capsule by mouth Daily. PRAVASTATIN (PRAVACHOL) 40 MG TABLET    Take 2 tablets by mouth every evening. ALLERGIES     is allergic to wellbutrin [bupropion hcl]. FAMILY HISTORY     He indicated that his mother is . He indicated that his father is . family history includes Heart Disease in his father; High Blood Pressure in his mother; Stroke in his mother. SOCIAL HISTORY    reports that he has been smoking cigarettes. He has been smoking an average of .25 packs per day. He has never used smokeless tobacco. He reports that he does not drink alcohol and does not use drugs.     PHYSICAL EXAM INITIAL VITALS:  oral temperature is 98.2 °F (36.8 °C). His blood pressure is 152/85 (abnormal) and his pulse is 82. His respiration is 18 and oxygen saturation is 98%. Physical Exam  Vitals and nursing note reviewed. Constitutional:       General: He is not in acute distress. Appearance: He is well-developed. He is not toxic-appearing or diaphoretic. HENT:      Head: Normocephalic and atraumatic. Right Ear: Hearing normal.      Left Ear: Hearing normal.      Nose: Nose normal. No rhinorrhea. Mouth/Throat:      Pharynx: Uvula midline. No oropharyngeal exudate. Eyes:      General: Lids are normal. No scleral icterus. Conjunctiva/sclera: Conjunctivae normal.      Pupils: Pupils are equal, round, and reactive to light. Neck:      Trachea: No tracheal deviation. Cardiovascular:      Rate and Rhythm: Normal rate and regular rhythm. Heart sounds: Normal heart sounds. No murmur heard. Pulmonary:      Effort: Pulmonary effort is normal. No respiratory distress. Breath sounds: Normal breath sounds. No stridor. No decreased breath sounds or wheezing. Abdominal:      General: There is no distension. Palpations: Abdomen is soft. Abdomen is not rigid. Tenderness: There is no abdominal tenderness. There is no guarding. Musculoskeletal:         General: Normal range of motion. Cervical back: Normal range of motion and neck supple. No rigidity. Lymphadenopathy:      Cervical: No cervical adenopathy. Skin:     General: Skin is warm and dry. Coloration: Skin is not pale. Findings: No rash. Neurological:      Mental Status: He is alert and oriented to person, place, and time. GCS: GCS eye subscore is 4. GCS verbal subscore is 5. GCS motor subscore is 6.       Gait: Gait normal.      Comments: No gross deficits observed   Psychiatric:         Mood and Affect: Mood normal.         Speech: Speech normal.         Behavior: Behavior normal.         Thought Content: Thought content normal.       DIFFERENTIAL DIAGNOSIS:   Including but not limited to: Glucosuria, uncontrolled diabetes, ADR to medication, DKA, euglycemic DKA    DIAGNOSTIC RESULTS     EKG: All EKG's are [facility] who either signs or Co-signs this chart in the absence of a cardiologist.  None    RADIOLOGY: non-plain film images(s) such as CT,Ultrasound and MRI are read by the radiologist.  Plain radiographic images are visualized and preliminarily interpreted by the emergency physician unless otherwise stated below.   No orders to display       LABS:   Labs Reviewed   URINALYSIS WITH REFLEX TO CULTURE - Abnormal; Notable for the following components:       Result Value    Glucose, Ur >= 1000 (*)     Ketones, Urine TRACE (*)     Specific Gravity, Urine > 1.030 (*)     All other components within normal limits   CBC WITH AUTO DIFFERENTIAL - Abnormal; Notable for the following components:    RBC 6.49 (*)     Hemoglobin 12.4 (*)     MCV 67.8 (*)     MCH 19.1 (*)     MCHC 28.2 (*)     RDW-CV 22.3 (*)     RDW-SD 48.9 (*)     All other components within normal limits   COMPREHENSIVE METABOLIC PANEL - Abnormal; Notable for the following components:    CO2 20 (*)     All other components within normal limits   BETA-HYDROXYBUTYRATE - Abnormal; Notable for the following components:    Beta-Hydroxybutyrate 6.65 (*)     All other components within normal limits   OSMOLALITY - Abnormal; Notable for the following components:    Osmolality Calc 274.7 (*)     All other components within normal limits   GLOMERULAR FILTRATION RATE, ESTIMATED   ANION GAP   SCAN OF BLOOD SMEAR       EMERGENCY DEPARTMENT COURSE:   Vitals:    Vitals:    11/04/22 1520 11/04/22 1743   BP: (!) 170/92 (!) 152/85   Pulse: 82    Resp: 18    Temp: 98.2 °F (36.8 °C)    TempSrc: Oral    SpO2: 98%            MDM:  The patient was seen and evaluated within the ED today for evaluation of high glucose and ketones in his urine in the setting of type 2 diabetes. On exam, I appreciated a smiling and pleasant patient who is nontoxic-appearing. Old records were reviewed. Within the department, I observed the patient's vital signs to be within acceptable range. Laboratory work was reassuring. There were no signs of DKA. Within the department, the patient required no medications and was noted drinking periodically from his water bottle. I observed the patient's condition to modestly remain stable during the duration of their stay. I have considered DKA, euglycemic DKA, dehydration, infection and this patient's presentation was not consistent with such entities and therefore, no further testing was warranted. The patient was comfortable with the plan of discharge home and to follow up with his PCP. Anticipatory guidance was given. The patient was instructed to return to the emergency department for any worsening of their symptoms. Patient was discharged from the emergency department in good condition with all questions answered. See disposition below. I have given the patient strict written and verbal instructions about care at home, follow-up, and signs and symptoms of worsening of condition and they did verbalize understanding. CRITICAL CARE:   None    CONSULTS:  None    PROCEDURES:  None    FINAL IMPRESSION      1. Labor abnormality    2. Glucosuria with normal serum glucose          DISPOSITION/PLAN     1. Labor abnormality    2. Glucosuria with normal serum glucose        PATIENT REFERRED TO:  CED Burton - CNP  [address]      As needed      DISCHARGE MEDICATIONS:  New Prescriptions    No medications on file       (Please note that portions of this note were completed with a voice recognition program.  Efforts were made to edit the dictations but occasionally words are mis-transcribed.)    Natalie Cuevas PA-C 11/04/22 5:44 PM    Natalie Cuevas PA-C Mayra Storm PA-C  11/04/22 1827

## 2023-08-14 ENCOUNTER — HOSPITAL ENCOUNTER (OUTPATIENT)
Dept: GENERAL RADIOLOGY | Age: 63
Discharge: HOME | End: 2023-08-14
Attending: INTERNAL MEDICINE
Payer: OTHER GOVERNMENT

## 2023-08-14 DIAGNOSIS — D64.9 ANEMIA, UNSPECIFIED TYPE: ICD-10-CM

## 2023-08-14 PROCEDURE — 2500000003 HC RX 250 WO HCPCS: Performed by: INTERNAL MEDICINE

## 2023-08-14 PROCEDURE — 74250 X-RAY XM SM INT 1CNTRST STD: CPT

## 2023-08-14 RX ADMIN — BARIUM SULFATE 600 ML: 240 SUSPENSION ORAL at 09:53

## 2024-03-30 ENCOUNTER — HOSPITAL ENCOUNTER (OUTPATIENT)
Dept: MRI IMAGING | Age: 64
Discharge: HOME | End: 2024-03-30
Payer: OTHER GOVERNMENT

## 2024-03-30 DIAGNOSIS — M54.50 LOW BACK PAIN, UNSPECIFIED BACK PAIN LATERALITY, UNSPECIFIED CHRONICITY, UNSPECIFIED WHETHER SCIATICA PRESENT: ICD-10-CM

## 2024-03-30 PROCEDURE — 72148 MRI LUMBAR SPINE W/O DYE: CPT

## 2024-12-23 ENCOUNTER — HOSPITAL ENCOUNTER (OUTPATIENT)
Dept: CT IMAGING | Age: 64
Discharge: HOME | End: 2024-12-23
Payer: OTHER GOVERNMENT

## 2024-12-23 DIAGNOSIS — Z87.891 PERSONAL HISTORY OF TOBACCO USE, PRESENTING HAZARDS TO HEALTH: ICD-10-CM

## 2024-12-23 PROCEDURE — 71271 CT THORAX LUNG CANCER SCR C-: CPT

## 2025-01-31 ENCOUNTER — HOSPITAL ENCOUNTER (OUTPATIENT)
Age: 65
Discharge: HOME | End: 2025-01-31
Payer: OTHER GOVERNMENT

## 2025-01-31 LAB
AUTO DIFF PNL BLD: ABNORMAL
BASOPHILS ABSOLUTE: 0.1 THOU/MM3 (ref 0–0.1)
BASOPHILS NFR BLD AUTO: 1.1 %
DEPRECATED RDW RBC AUTO: 42.7 FL (ref 35–45)
EOSINOPHIL NFR BLD AUTO: 6.4 %
EOSINOPHILS ABSOLUTE: 0.5 THOU/MM3 (ref 0–0.4)
ERYTHROCYTE [DISTWIDTH] IN BLOOD BY AUTOMATED COUNT: 13 % (ref 11.5–14.5)
HCT VFR BLD AUTO: 55.4 % (ref 42–52)
HGB BLD-MCNC: 18.8 GM/DL (ref 14–18)
IMM GRANULOCYTES # BLD AUTO: 0.01 THOU/MM3 (ref 0–0.07)
IMM GRANULOCYTES NFR BLD AUTO: 0.1 %
LYMPHOCYTES ABSOLUTE: 2.3 THOU/MM3 (ref 1–4.8)
LYMPHOCYTES NFR BLD AUTO: 28.4 %
MCH RBC QN AUTO: 30.5 PG (ref 26–33)
MCHC RBC AUTO-ENTMCNC: 33.9 GM/DL (ref 32.2–35.5)
MCV RBC AUTO: 89.8 FL (ref 80–94)
MONOCYTES ABSOLUTE: 0.6 THOU/MM3 (ref 0.4–1.3)
MONOCYTES NFR BLD AUTO: 7.7 %
NEUTROPHILS ABSOLUTE: 4.6 THOU/MM3 (ref 1.8–7.7)
NEUTROPHILS NFR BLD AUTO: 56.3 %
NRBC BLD AUTO-RTO: 0 /100 WBC
PATHOLOGIST REVIEW: ABNORMAL
PLATELET # BLD AUTO: 205 THOU/MM3 (ref 130–400)
PLATELET BLD QL SMEAR: ADEQUATE
PMV BLD AUTO: 10.3 FL (ref 9.4–12.4)
RBC # BLD AUTO: 6.17 MILL/MM3 (ref 4.7–6.1)
SCAN OF BLOOD SMEAR: NORMAL
WBC # BLD AUTO: 8.1 THOU/MM3 (ref 4.8–10.8)

## 2025-01-31 PROCEDURE — 85025 COMPLETE CBC W/AUTO DIFF WBC: CPT

## 2025-01-31 PROCEDURE — 84238 ASSAY NONENDOCRINE RECEPTOR: CPT

## 2025-01-31 PROCEDURE — 36415 COLL VENOUS BLD VENIPUNCTURE: CPT

## 2025-02-02 LAB — STFR SERPL-MCNC: 3 MG/L (ref 2.2–5)
